# Patient Record
Sex: MALE | Race: WHITE | NOT HISPANIC OR LATINO | URBAN - METROPOLITAN AREA
[De-identification: names, ages, dates, MRNs, and addresses within clinical notes are randomized per-mention and may not be internally consistent; named-entity substitution may affect disease eponyms.]

---

## 2018-05-11 ENCOUNTER — AMBULATORY - HEALTHEAST (OUTPATIENT)
Dept: CARDIAC REHAB | Facility: CLINIC | Age: 63
End: 2018-05-11

## 2018-05-11 DIAGNOSIS — Z95.5 S/P CORONARY ARTERY STENT PLACEMENT: ICD-10-CM

## 2018-05-11 DIAGNOSIS — I21.4 NSTEMI (NON-ST ELEVATED MYOCARDIAL INFARCTION) (H): ICD-10-CM

## 2018-05-21 ENCOUNTER — AMBULATORY - HEALTHEAST (OUTPATIENT)
Dept: CARDIAC REHAB | Facility: CLINIC | Age: 63
End: 2018-05-21

## 2018-05-21 ENCOUNTER — COMMUNICATION - HEALTHEAST (OUTPATIENT)
Dept: TELEHEALTH | Facility: CLINIC | Age: 63
End: 2018-05-21

## 2018-05-21 DIAGNOSIS — I21.4 NSTEMI (NON-ST ELEVATED MYOCARDIAL INFARCTION) (H): ICD-10-CM

## 2018-05-21 DIAGNOSIS — Z95.5 S/P CORONARY ARTERY STENT PLACEMENT: ICD-10-CM

## 2018-05-21 RX ORDER — ACETAMINOPHEN 500 MG
500 TABLET ORAL EVERY 6 HOURS PRN
Status: SHIPPED | COMMUNITY
Start: 2018-05-21

## 2018-05-21 RX ORDER — NITROGLYCERIN 0.4 MG/1
0.4 TABLET SUBLINGUAL EVERY 5 MIN PRN
Status: SHIPPED | COMMUNITY
Start: 2018-05-21

## 2018-05-21 RX ORDER — FERROUS SULFATE 325(65) MG
1 TABLET ORAL
Status: SHIPPED | COMMUNITY
Start: 2018-05-21

## 2018-05-21 RX ORDER — CLOPIDOGREL BISULFATE 75 MG/1
75 TABLET ORAL DAILY
Status: SHIPPED | COMMUNITY
Start: 2018-05-21

## 2018-05-21 RX ORDER — LOSARTAN POTASSIUM 50 MG/1
50 TABLET ORAL DAILY
Status: SHIPPED | COMMUNITY
Start: 2018-05-21

## 2018-05-21 RX ORDER — METOPROLOL TARTRATE 25 MG/1
25 TABLET, FILM COATED ORAL 2 TIMES DAILY
Status: SHIPPED | COMMUNITY
Start: 2018-05-21

## 2018-05-25 ENCOUNTER — AMBULATORY - HEALTHEAST (OUTPATIENT)
Dept: CARDIAC REHAB | Facility: CLINIC | Age: 63
End: 2018-05-25

## 2018-05-25 DIAGNOSIS — Z95.5 S/P CORONARY ARTERY STENT PLACEMENT: ICD-10-CM

## 2018-05-25 DIAGNOSIS — I21.4 NSTEMI (NON-ST ELEVATED MYOCARDIAL INFARCTION) (H): ICD-10-CM

## 2021-06-01 VITALS — WEIGHT: 160 LBS

## 2021-06-01 VITALS — WEIGHT: 158.2 LBS

## 2021-06-18 NOTE — PROGRESS NOTES
ITP ASSESSMENT /DISCHARGE (transferring care to [facility])  Assessment Day: 30 Day  Session Number: 2  Precautions: standard cardiac, left knee pain  Diagnosis: MI;Stent  Risk Stratification: Medium  Referring Provider: Devonte Penn MBBS  EXERCISE  Exercise Assessment: Discharge     6 Minute Walk Test   Pre   Pre Exercise HR: 86                  Pre Exercise BP: 122/87    Peak  Peak HR: 109                 Peak BP: 142/96  Peak feet: 825  Peak O2 SAT: 100  Peak RPE: 12  Peak MPH: 1.56    Symptoms:  Peak Symptoms: mild SOB    5 mins. Post  5 Min Post HR: 86  5 Min Post BP: 139/95                         Exercise Plan  Goals Next 30 days  ADL'S: Patient will be able to carry a 10# load on level surface within ADL routine without symptoms.  Leisure: Patient will be able to walk a block to the bus without fatigue in order to resume usual social outing schedule without symtpoms.  Work: Patient will be able to resume part time work, light duty without symptoms.    Education Goals: Patient can state cardiac s/s and appropriate emergency response.;Has system for taking medication.;Medication review  Education Goals Met: Has system for taking medication.                        Goals Met  Initial ADL's goals met: Goals not met.  Transferring care to [facility].  Initial Leisure goals met: Goals not met, transferring care to [facility]  Intial Work goals met: Goals not met, transferring care to [facility].  Initial Progression: Patient participated in 2 cardiac rehab sessions before deciding to resume care back at [facility].    Exercise Prescription  Exercise Mode: Treadmill;Bike;Nustep;Arm Erg.  Frequency: 2-3 times per week.  Duration: 20-30 minutes  Intensity / THR: 20-30 beats above resting heart rate  RPE 11-14  Progression / Met level: 2-3 METS  Resistive Training?: Yes    Current Exercise (mins/week): 1    Interventions  Home Exercise:  Mode: walking  Frequency: off rehab days  Duration: 15-30 minutes    Education Material :  "Educational videos;Provide written material;Individual education and counseling;Offer educational classes    Education Completed  Exercise Education Completed: Cardiac Anatomy;Signs and Symptoms            Exercise Follow-up/Discharge  Follow up/Discharge: Patient encouraged to initiate home exercise program working up to 30-45 minutes of exercise daily.         NUTRITION  Nutrition Assessment: Discharge    Nutrition Risk Factors:  Nutrition Risk Factors: Dyslipidemia  Cholesterol: 147  LDL: 90  HDL: 35  Triglycerides: 111    Nutrition Plan  Interventions    Other Nutrition Intervention: Educational Videos;Provide with Written Material        Education Completed  Nutrition Education Completed: Low Saturated fat diet;Low sodium diet    Goals  Nutrition Goals (Next 30 days): Patient can identify their risk factors for CAD;Patient will maintain current weight or gradual weight gain;Patient knows appropriate portion size;Patient will follow a low saturated fat diet;Patient will follow a low sodium diet    Goals Met  Nutrition Goals Met: Patient follows a low sodium diet    Height, Weight, and  BMI  Weight: 160 lb (72.6 kg)  Height: 5' 7\" (1.702 m)  BMI: 25.05    Nutrition Follow-up  Follow-up/Discharge: Tranferring care to [facility].     Other Risk Factors  Other Risk Factor Assessment: Discharge    HTN Risk Factor: Hypertension    Pre Exercise BP: 116/72  Post Exercise BP: 118/68    Hypertension Plan  Goals  HTN Goals: Take medication as prescribed;Exercises regularly;Follow low sodium diet    Goals Met  HTN Goals Met: Take medication as prescribed    HTN Interventions  HTN Interventions: Therapist/patient discussion;Offer educational videos;Provide written material;Offer educational classes    HTN Education Completed  HTN Education Completed: Low sodium class    Tobacco Risk Factor: NA         PSYCHOSOCIAL  Psychosocial Assessment: Discharge     Akhil AYALA Q of L Summary Score: 28    ELSA-D Score: 23    Psychosocial " Risk Factor: Stress    Psychosocial Plan  Interventions  Interventions: Provide written material;Offer educational videos and classes;Individual education and counseling    Education Completed  Education Completed: Effects of stress on body    Goals  Goals (Next 30 days): Identify stressors;Improvement in [employer] COOP score;Practicing stress management skills;Oriented to stress management classes;Identified Support system    Goals Met  Goals Met: Identified Support system;Identify stressors    Psychosocial Follow-up  Follow-up/Discharge: Reports brother and sister are helpful.  Work/insurance situation is stressful.         Patient involved in Goal setting?: No    Signature: _____________________________________________________________    Date: __________________    Time: __________________

## 2021-06-18 NOTE — PROGRESS NOTES
ITP ASSESSMENT   Assessment Day: Initial  Session Number: 1  Precautions: standard cardiac, left knee pain  Diagnosis: MI;Stent  Risk Stratification: Medium (based on LDL)  Referring Provider: Devonte Penn MBBS  EXERCISE  Exercise Assessment: Initial     6 Minute Walk Test   Pre   Pre Exercise HR: 86                  Pre Exercise BP: 122/87    Peak  Peak HR: 109                 Peak BP: 142/96  Peak feet: 825  Peak O2 SAT: 100  Peak RPE: 12  Peak MPH: 1.56    Symptoms:  Peak Symptoms: mild SOB    5 mins. Post  5 Min Post HR: 86  5 Min Post BP: 139/95                         Exercise Plan  Goals Next 30 days  ADL'S: Patient will be able to carry a 10# load on level surface within ADL routine without symptoms.  Leisure: Patient will be able to walk a block to the bus without fatigue in order to resume usual social outing schedule without symtpoms.  Work: Patient will be able to resume part time work, light duty without symptoms.              Education Goals: Patient can state cardiac s/s and appropriate emergency response.;Has system for taking medication.;Medication review  Education Goals Met: Has system for taking medication.    Exercise Prescription  Exercise Mode: Treadmill;Bike;Nustep;Arm Erg.  Frequency: 2-3 times per week.  Duration: 20-30 minutes  Intensity / THR: 20-30 beats above resting heart rate  RPE 11-14  Progression / Met level: 2-3 METS  Resistive Training?: Yes    Current Exercise (mins/week): 1    Interventions  Home Exercise:  Mode: walking  Frequency: off rehab days  Duration: 15-30 minutes    Education Material : Educational videos;Provide written material;Individual education and counseling;Offer educational classes    Education Completed  Exercise Education Completed: Cardiac Anatomy;Signs and Symptoms            Exercise Follow-up/Discharge  Follow up/Discharge: Patient encouraged to initiate  NUTRITION  Nutrition Assessment: Initial    Nutrition Risk Factors:  Nutrition Risk Factors:  "Dyslipidemia  Cholesterol: 147  LDL: 90  HDL: 35  Triglycerides: 111    Nutrition Plan  Interventions    Other Nutrition Intervention: Educational Videos;Provide with Written Material;Therapist/Pt Discussion;Diet Class      Education Completed  Nutrition Education Completed: Low sodium diet;Low Saturated fat diet    Goals  Nutrition Goals (Next 30 days): Patient can identify their risk factors for CAD;Patient will maintain current weight or gradual weight gain;Patient knows appropriate portion size;Patient will follow a low saturated fat diet;Patient will follow a low sodium diet    Goals Met  Nutrition Goals Met: Patient follows a low sodium diet    Height, Weight, and  BMI  Weight: 160 lb (72.6 kg)  Height: 5' 7\" (1.702 m)  BMI: 25.05    Nutrition Follow-up  Follow-up/Discharge: Wants to schedule session with dietician.       Other Risk Factors  Other Risk Factor Assessment: Initial    HTN Risk Factor: Hypertension    Pre Exercise BP: 122/87  Post Exercise BP: 139/85    Hypertension Plan  Goals  HTN Goals: Take medication as prescribed;Exercises regularly;Follow low sodium diet    Goals Met  HTN Goals Met: Take medication as prescribed    HTN Interventions  HTN Interventions: Therapist/patient discussion;Offer educational videos;Offer educational classes;Provide written material    HTN Education Completed  HTN Education Completed: Low sodium class    Tobacco Risk Factor: NA (quit 1/1/2003; 2ppd  history)         PSYCHOSOCIAL  Psychosocial Assessment: Initial     [place] COOP Q of L Summary Score: 28    ELSA-D Score: 23    Psychosocial Risk Factor: Stress    Psychosocial Plan  Interventions  If ELSA-D > 15 send letter to MD  Interventions: Provide written material;Offer educational videos and classes;Individual education and counseling    Education Completed  Education Completed: Effects of stress on body    Goals  Goals (Next 30 days): Identify stressors;Improvement in [employer] COOP score;Practicing stress " management skills;Oriented to stress management classes;Identified Support system    Goals Met  Goals Met: Identified Support system;Identify stressors    Psychosocial Follow-up  Follow-up/Discharge: Reports brother and sisrter are helpful.  Work/insurance situation is stressful.           Patient involved in Goal setting?: Yes    Signature: _____________________________________________________________    Date: __________________    Time: __________________

## 2021-06-18 NOTE — PROGRESS NOTES
"Cardiac Rehab  Phase II Assessment    Assessment Date: 5/21/2018    Diagnosis: NSTEMI  Date of Onset: 5/3/2018  Procedure: PCI/DOROTHY to pRCA, m LCirc  Date of Onset: 5/3/2018  ICD/Pacemaker: No Parameters: none  Post-op Complications: none  ECG History: SR EF%:55%  Past Medical History: left knee injury, dysphagia    Physical Assessment  Precautions/ Physical Limitations: left knee bothersome from injury  Oxygen: No  O2 Sats: 100% Lung Sounds: Clear per LATASHA Harrell Edema: no  Incisions: na  Sleeping Pattern: good   Appetite: good   Nutrition Risk Screen: NA    Pain  Location: left knee  Characteristics:aching  Intensity: (0-10 scale) 2  (takes me a while to get going.)  Current Pain Management: tylenol  Intervention: meds,reports heat does not help, going to PT  Response: good    Psychosocial/ Emotional Health  1. In the past 12 months, have you been in a relationship where you have been abused physically, emotionally, sexually or financially? No  notified: No  2. Who do you turn to for emotional support?: brother and sister  3. Do you have cultural or spiritual needs? No  4. Have there been any major life changes in the past 12 months? No    Referral Information  Primary Physician: Chris Donnelly MD  Cardiologist: does not have one  Surgeon: SARAH    Home exercise/Equipment: denies     Patient's long-term goal(s): \"Get better.\"  \"Walk a block without SOB to catch the bus.\"    1. Living Accommodations: Apartment Steps:7 into building No      Support people at home: no   2. Marital Status: single  3. Family is able to assist with cares (brother and sister.)      Denominational/Community involvement: adequate  4. Recreation/Hobbies: watching TV, walking          "